# Patient Record
Sex: FEMALE | Race: ASIAN | NOT HISPANIC OR LATINO
[De-identification: names, ages, dates, MRNs, and addresses within clinical notes are randomized per-mention and may not be internally consistent; named-entity substitution may affect disease eponyms.]

---

## 2017-11-09 ENCOUNTER — APPOINTMENT (OUTPATIENT)
Dept: OTOLARYNGOLOGY | Facility: CLINIC | Age: 47
End: 2017-11-09
Payer: COMMERCIAL

## 2017-11-09 VITALS
DIASTOLIC BLOOD PRESSURE: 76 MMHG | OXYGEN SATURATION: 97 % | SYSTOLIC BLOOD PRESSURE: 110 MMHG | TEMPERATURE: 98.4 F | HEART RATE: 89 BPM

## 2017-11-09 PROCEDURE — 31575 DIAGNOSTIC LARYNGOSCOPY: CPT

## 2017-11-09 PROCEDURE — 99214 OFFICE O/P EST MOD 30 MIN: CPT | Mod: 25

## 2017-11-09 RX ORDER — GUAIFENESIN 1200 MG/1
1200 TABLET, EXTENDED RELEASE ORAL
Qty: 28 | Refills: 0 | Status: ACTIVE | COMMUNITY
Start: 2017-11-09 | End: 1900-01-01

## 2017-11-09 RX ORDER — IBUPROFEN AND PSEUDOEPHEDRINE HYDROCHLORIDE 200; 30 MG/1; MG/1
30-200 TABLET, COATED ORAL
Qty: 30 | Refills: 0 | Status: ACTIVE | COMMUNITY
Start: 2017-11-09 | End: 1900-01-01

## 2018-06-04 ENCOUNTER — APPOINTMENT (OUTPATIENT)
Dept: OTOLARYNGOLOGY | Facility: CLINIC | Age: 48
End: 2018-06-04
Payer: COMMERCIAL

## 2018-06-04 VITALS
TEMPERATURE: 98 F | SYSTOLIC BLOOD PRESSURE: 114 MMHG | HEART RATE: 80 BPM | OXYGEN SATURATION: 98 % | DIASTOLIC BLOOD PRESSURE: 79 MMHG

## 2018-06-04 DIAGNOSIS — H92.01 OTALGIA, RIGHT EAR: ICD-10-CM

## 2018-06-04 DIAGNOSIS — H75.81 OTHER SPECIFIED DISORDERS OF RIGHT MIDDLE EAR AND MASTOID IN DISEASES CLASSIFIED ELSEWHERE: ICD-10-CM

## 2018-06-04 DIAGNOSIS — H93.90 UNSPECIFIED DISORDER OF EAR, UNSPECIFIED EAR: ICD-10-CM

## 2018-06-04 PROCEDURE — 99214 OFFICE O/P EST MOD 30 MIN: CPT | Mod: 25

## 2018-06-04 PROCEDURE — 92557 COMPREHENSIVE HEARING TEST: CPT

## 2018-06-04 PROCEDURE — 92511 NASOPHARYNGOSCOPY: CPT

## 2018-06-04 PROCEDURE — 92567 TYMPANOMETRY: CPT

## 2018-06-05 PROBLEM — H75.81 OTHER SPECIFIED DISORDERS OF RIGHT MIDDLE EAR AND MASTOID IN DISEASES CLASSIFIED ELSEWHERE: Status: ACTIVE | Noted: 2018-06-05

## 2018-06-05 PROBLEM — H92.01 OTALGIA OF RIGHT EAR: Status: ACTIVE | Noted: 2018-06-05

## 2018-06-05 PROBLEM — H93.90 EAR PROBLEM: Status: ACTIVE | Noted: 2018-06-04

## 2018-06-18 ENCOUNTER — APPOINTMENT (OUTPATIENT)
Dept: OTOLARYNGOLOGY | Facility: CLINIC | Age: 48
End: 2018-06-18

## 2018-11-29 ENCOUNTER — APPOINTMENT (OUTPATIENT)
Dept: OTOLARYNGOLOGY | Facility: CLINIC | Age: 48
End: 2018-11-29
Payer: COMMERCIAL

## 2018-11-29 VITALS
OXYGEN SATURATION: 98 % | HEART RATE: 84 BPM | DIASTOLIC BLOOD PRESSURE: 87 MMHG | TEMPERATURE: 98.5 F | RESPIRATION RATE: 17 BRPM | SYSTOLIC BLOOD PRESSURE: 137 MMHG

## 2018-11-29 DIAGNOSIS — J04.0 ACUTE LARYNGITIS: ICD-10-CM

## 2018-11-29 DIAGNOSIS — K21.9 ACUTE LARYNGITIS: ICD-10-CM

## 2018-11-29 PROCEDURE — 31575 DIAGNOSTIC LARYNGOSCOPY: CPT

## 2018-11-29 PROCEDURE — 76536 US EXAM OF HEAD AND NECK: CPT

## 2018-11-29 PROCEDURE — 99214 OFFICE O/P EST MOD 30 MIN: CPT | Mod: 25

## 2018-12-05 LAB
25(OH)D3 SERPL-MCNC: 31.4 NG/ML
T3FREE SERPL-MCNC: 2.88 PG/ML
T4 FREE SERPL-MCNC: 2 NG/DL
TSH SERPL-ACNC: 0.82 UIU/ML

## 2019-03-20 ENCOUNTER — APPOINTMENT (OUTPATIENT)
Dept: OTOLARYNGOLOGY | Facility: CLINIC | Age: 49
End: 2019-03-20
Payer: COMMERCIAL

## 2019-03-20 VITALS
HEART RATE: 86 BPM | TEMPERATURE: 98.2 F | DIASTOLIC BLOOD PRESSURE: 75 MMHG | OXYGEN SATURATION: 99 % | SYSTOLIC BLOOD PRESSURE: 127 MMHG

## 2019-03-20 DIAGNOSIS — E06.3 AUTOIMMUNE THYROIDITIS: ICD-10-CM

## 2019-03-20 DIAGNOSIS — E04.1 NONTOXIC SINGLE THYROID NODULE: ICD-10-CM

## 2019-03-20 PROCEDURE — 31575 DIAGNOSTIC LARYNGOSCOPY: CPT

## 2019-03-20 PROCEDURE — 99214 OFFICE O/P EST MOD 30 MIN: CPT | Mod: 25

## 2019-03-20 NOTE — REASON FOR VISIT
[FreeTextEntry2] :  a followup for a thyroid nodules,  hypothyroidism and autoimmune thyroiditis. [FreeTextEntry1] : PCP is MD Shilpa  [facility]  [phone].  Dr. Marilynn Lemos is her Endocrinologist.  [address] [place] [address]

## 2019-03-20 NOTE — PROCEDURE
[Hoarseness] : hoarseness not clearly evaluated by indirect laryngoscopy [Image(s) Captured] : image(s) captured and filed [Complicated Symptoms] : complicated symptoms requiring more thorough examination than provided by mirror [Globus] : globus [Topical Lidocaine] : topical lidocaine [Flexible Endoscope] : examined with the flexible endoscope [Serial Number: ___] : Serial Number: [unfilled] [de-identified] : The nasal septum is minimally deviated to the left. There are no masses or polyps. Nasal secretions are thick but not purulent. The choanae and posterior nasopharynx are normal without masses or drainage. The Eustachian tube orifices appear patent. The pharynx, including the posterior and lateral pharyngeal walls, the vallecula and base of tongue are normal without ulcerations, lesions or masses. The hypopharynx including the pyriform sinuses open well without pooling of secretions, mucosal lesions or masses. The supraglottic larynx including the epiglottis, petiole, glossoepiglottic folds and pharyngoepiglottic folds are normal without mucosal lesions, ulcerations or masses. The glottis reveals normal false vocal folds. The true vocal folds are glistening white, tense and of equal length, without paralysis, having symmetric mobility on adduction and abduction. There are no mucosal lesions, nodules, cysts, erythroplasia or leukoplakia. The posterior cricoid area has healthy pink mucosa in the interarytenoid area and esophageal inlet. There is persistent thickening/edema/tiger striping of the interarytenoid mucosa and lingual tonsillar hyperplasia consistent with laryngopharyngeal acid reflux disease. The trachea is clear without narrowing, deviation or lesions. \par  [de-identified] : thyroid nodules, thyroiditis and neck tightness

## 2019-03-25 LAB
25(OH)D3 SERPL-MCNC: 28.7 NG/ML
T3FREE SERPL-MCNC: 2.79 PG/ML
T4 FREE SERPL-MCNC: 1.9 NG/DL
TSH SERPL-ACNC: 1.1 UIU/ML

## 2020-12-07 ENCOUNTER — APPOINTMENT (OUTPATIENT)
Dept: OTOLARYNGOLOGY | Facility: CLINIC | Age: 50
End: 2020-12-07

## 2021-02-16 ENCOUNTER — EMERGENCY (EMERGENCY)
Facility: HOSPITAL | Age: 51
LOS: 0 days | Discharge: HOME | End: 2021-02-16
Payer: COMMERCIAL

## 2021-02-16 VITALS
DIASTOLIC BLOOD PRESSURE: 91 MMHG | HEIGHT: 65 IN | RESPIRATION RATE: 19 BRPM | TEMPERATURE: 98 F | HEART RATE: 93 BPM | WEIGHT: 184.09 LBS | SYSTOLIC BLOOD PRESSURE: 137 MMHG | OXYGEN SATURATION: 99 %

## 2021-02-16 DIAGNOSIS — Z79.1 LONG TERM (CURRENT) USE OF NON-STEROIDAL ANTI-INFLAMMATORIES (NSAID): ICD-10-CM
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
## 2021-02-16 DIAGNOSIS — M79.675 PAIN IN LEFT TOE(S): ICD-10-CM

## 2021-02-16 DIAGNOSIS — M79.672 PAIN IN LEFT FOOT: ICD-10-CM

## 2021-02-16 DIAGNOSIS — E03.9 HYPOTHYROIDISM, UNSPECIFIED: ICD-10-CM

## 2021-02-16 DIAGNOSIS — Z88.0 ALLERGY STATUS TO PENICILLIN: ICD-10-CM
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
## 2021-02-16 DIAGNOSIS — Z79.84 LONG TERM (CURRENT) USE OF ORAL HYPOGLYCEMIC DRUGS: ICD-10-CM

## 2021-02-16 DIAGNOSIS — Z88.8 ALLERGY STATUS TO OTHER DRUGS, MEDICAMENTS AND BIOLOGICAL SUBSTANCES: ICD-10-CM

## 2021-02-16 DIAGNOSIS — E11.9 TYPE 2 DIABETES MELLITUS WITHOUT COMPLICATIONS: ICD-10-CM

## 2021-02-16 LAB — HCG UR QL: NEGATIVE — SIGNIFICANT CHANGE UP

## 2021-02-16 PROCEDURE — 73630 X-RAY EXAM OF FOOT: CPT | Mod: 26,LT

## 2021-02-16 PROCEDURE — 99283 EMERGENCY DEPT VISIT LOW MDM: CPT

## 2021-02-16 RX ORDER — ACETAMINOPHEN 500 MG
650 TABLET ORAL ONCE
Refills: 0 | Status: COMPLETED | OUTPATIENT
Start: 2021-02-16 | End: 2021-02-16

## 2021-02-16 RX ADMIN — Medication 650 MILLIGRAM(S): at 17:06

## 2021-02-16 NOTE — ED PROVIDER NOTE - PROGRESS NOTE DETAILS
AAox3 non toxic looking afebrile sitting in chair in NAD, breathing comfortably. tylenol given for pain. pain improved. Xray right toes/foot ordered. UCG pending. patient remains asymptomatic. no complaint at this time

## 2021-02-16 NOTE — ED PROVIDER NOTE - OBJECTIVE STATEMENT
51 y/o female UCG pending, patient denies pregnancy, waiver signed for xray, with PMHx DM, hypothyroidism presented to the ED with complaint of intermittent sharp left big toe pain radiating to left danny area post twisting foot while going down the stairs, better with motrin / tylenol, worst with walking. able to walk denies fall, injury, trauma, loc, syncope, numbness, weakness, lost of motion, decrease sensory

## 2021-02-16 NOTE — ED PROVIDER NOTE - CLINICAL SUMMARY MEDICAL DECISION MAKING FREE TEXT BOX
51 y/o female UCG pending, patient denies pregnancy, waiver signed for xray, with PMHx DM, hypothyroidism here for left big toe pain post twisting while going down the stairs    imp : MSK pain vs DJD vs arthritis vs bone spur r/o fx or dislocation     plan   tylenol   xray   rest, ice/warm compress, reassess   patient education   F/u with PCP

## 2021-02-16 NOTE — ED PROVIDER NOTE - NSFOLLOWUPCLINICS_GEN_ALL_ED_FT
[facility]  Podiatry  [address] [place] [address] - 3rd [place] [address]  Phone: [phone]  Fax:   Follow Up Time:

## 2021-02-16 NOTE — ED PROVIDER NOTE - PATIENT PORTAL LINK FT
You can access the FollowMyHealth Patient Portal offered by [facility] by registering at the following website: [email or web address]. By joining [employer]’s FollowMyHealth portal, you will also be able to view your health information using other applications (apps) compatible with our system.

## 2021-02-16 NOTE — ED PROVIDER NOTE - NSFOLLOWUPINSTRUCTIONS_ED_ALL_ED_FT
please take tylenol 325 mg every 4 hour as needed for pain     Please return to the ED for worst symptoms numbness, discoloration, paresthesia, weakness, lost of motion, decrease sensory,     please schedule to follow up with Podiatry doctor within 7 days for toe pain [phone] please take Tylenol 325 mg every 4 hour as needed for pain x 7 days     Please return to the ED for worst symptoms numbness, discoloration, paresthesia, weakness, lost of motion, decrease sensory, increase pain,     please schedule to follow up with Podiatry doctor within 7 days for toe pain [phone].  Xray of left foot and toes were done, shows no fracture or dislocation

## 2021-02-16 NOTE — ED PROVIDER NOTE - MUSCULOSKELETAL MINIMAL EXAM
left proximal big toe localized swelling with assocated TTP, limited ROM due to pain and swelling, skiin intact, distal pulse intact, cap refill < 2 sec,

## 2022-03-22 ENCOUNTER — APPOINTMENT (OUTPATIENT)
Dept: OTOLARYNGOLOGY | Facility: CLINIC | Age: 52
End: 2022-03-22
Payer: COMMERCIAL

## 2022-03-22 VITALS
OXYGEN SATURATION: 99 % | HEIGHT: 65 IN | TEMPERATURE: 98.2 F | HEART RATE: 78 BPM | SYSTOLIC BLOOD PRESSURE: 127 MMHG | WEIGHT: 185 LBS | BODY MASS INDEX: 30.82 KG/M2 | DIASTOLIC BLOOD PRESSURE: 80 MMHG

## 2022-03-22 PROCEDURE — 76536 US EXAM OF HEAD AND NECK: CPT

## 2022-03-22 PROCEDURE — 31575 DIAGNOSTIC LARYNGOSCOPY: CPT

## 2022-03-22 PROCEDURE — 99215 OFFICE O/P EST HI 40 MIN: CPT | Mod: 25

## 2022-03-22 NOTE — CONSULT LETTER
[Dear  ___] : Dear  [unfilled], [Consult Letter:] : I had the pleasure of evaluating your patient, [unfilled]. [Please see my note below.] : Please see my note below. [Consult Closing:] : Thank you very much for allowing me to participate in the care of this patient.  If you have any questions, please do not hesitate to contact me. [Sincerely,] : Sincerely, [FreeTextEntry3] : \par Chidi Monk M.D., FACS, ECNU\par Director Center for Thyroid & Parathyroid Surgery\par [facility] [place]\par Certified in Thyroid/Parathyroid/Neck Ultrasound, ECNU/ AIUM\par \par , Department of Otolaryngology\par [facility] [employer] [facility] at [facility]\par

## 2022-03-22 NOTE — PROCEDURE
[Image(s) Captured] : image(s) captured and filed [Hoarseness] : hoarseness not clearly evaluated by indirect laryngoscopy [Complicated Symptoms] : complicated symptoms requiring more thorough examination than provided by mirror [Globus] : globus [Topical Lidocaine] : topical lidocaine [Oxymetazoline HCl] : oxymetazoline HCl [Flexible Endoscope] : examined with the flexible endoscope [Serial Number: ___] : Serial Number: [unfilled] [FreeTextEntry3] : \par \par [facility]\par THYROID/NECK ULTRASOUND REPORT\par \par NAME: YUE METZGER            MR# [id number]	              : 1970	         DATE: 3/22/2022\par \par HISTORY/ INDICATIONS: A 51-year-old female with autoimmune thyroiditis and subcentimeter thyroid nodules/cysts for follow up.\par \par COMPARISON: Study dated 18.\par \par PROCEDURE: Physician performed high-resolution ultrasound gray scale imaging and color Doppler supplementation of the thyroid gland and neck was obtained in the longitudinal and transverse planes using a 13 MHz linear transducer with image capture.  All measurements are in centimeters (longitudinal x AP x transverse).  \par \par FINDINGS: Overall the thyroid gland is normal in size, hypoechoic and markedly heterogeneous in echotexture with numerous probable pseudo nodules, and echogenic septations consistent with autoimmune thyroiditis.  There is a stable 6 mm right mid-lower lobe colloid cyst. Vascularity is minimal on color Doppler flow.  \par \par RIGHT LOBE: Is not enlarged, hypoechoic and markedly heterogeneous in echotexture with numerous probable pseudo nodules, and echogenic septations consistent with autoimmune thyroiditis.  Vascularity is minimal color Doppler and measures 4.02 x 1.43 x 1.09 cm.  NODULES:  No definitive nodules are identified but there is micro nodularity throughout consistent with autoimmune thyroiditis.  Within the mid-lower right lobe there is a smoothly marginated hypoechoic nodule/cyst without calcifications and grade 1 vascularity that measures 0.66 x 0.35 x 0.61 cm (previously 0.49 x 0.27 x 0.54 essentially unchanged).\par \par ISTHMUS: Measures 0.69 cm in AP dimension and is heterogeneous in echotexture with decreased vascularity.  No nodules are identified.\par \par LEFT LOBE: Is not enlarged, hypoechoic and markedly heterogeneous in echotexture with numerous probable pseudo nodules, and echogenic septations consistent with autoimmune thyroiditis and measures 4.43 x 1.25 x 1.08 cm.  Vascularity is minimal on color Doppler.    NODULES: No definitive nodules are identified but there is micro nodularity throughout consistent with autoimmune thyroiditis.\par \par PARATHYROID GLANDS: There are no identified enlarged parathyroid glands in the central neck compartment. \par \par LYMPH NODES: There are several benign appearing subcentimeter lymph nodes identified at neck levels III bilaterally, all with echogenic hilar lines and a short long axis ratio < 0.5 in the transverse plane.\par \par IMPRESSION: A 51-year-old female with a non-enlarged heterogeneous thyroid gland consistent with autoimmune thyroiditis and a stable right interpolar cyst.  No other nodules are currently identified among many probable pseudo nodules of autoimmune thyroiditis.\par \par RECOMMENDATIONS: A follow up ultrasound can be considered in 2-3 years.\par \par Electronically signed by Chidi Monk MD on 3/22/2022 4:30 PM. [de-identified] : The nasal septum is minimally deviated to the left. There are no masses or polyps. Nasal secretions are thick but not purulent. The choanae and posterior nasopharynx are normal without masses or drainage. The Eustachian tube orifices appear patent. The pharynx, including the posterior and lateral pharyngeal walls, the vallecula and base of tongue are normal without ulcerations, lesions or masses. The hypopharynx including the pyriform sinuses open well without pooling of secretions, mucosal lesions or masses. The supraglottic larynx including the epiglottis, petiole, glossoepiglottic folds and pharyngoepiglottic folds are normal without mucosal lesions, ulcerations or masses. The glottis reveals normal false vocal folds. The true vocal folds are glistening white, tense and of equal length, without paralysis, having symmetric mobility on adduction and abduction. There are no mucosal lesions, nodules, cysts, erythroplasia or leukoplakia. The posterior cricoid area has healthy pink mucosa in the interarytenoid area and esophageal inlet. There is mild thickening/edema/tiger striping of the interarytenoid mucosa and lingual tonsillar hyperplasia consistent with laryngopharyngeal acid reflux disease. The trachea is clear without narrowing, deviation or lesions. \par  [de-identified] : thyroid nodules, thyroiditis and neck tightness.

## 2022-03-22 NOTE — DATA REVIEWED
[de-identified] : see HPI [de-identified] : see HPI [de-identified] : Cytology report reviewed

## 2022-03-22 NOTE — REASON FOR VISIT
[FreeTextEntry2] :  a followup for a thyroid nodules, hypothyroidism and autoimmune thyroiditis. [FreeTextEntry1] : PCP is MD Shilpa  [facility]  [phone].  Dr. Marilynn Lemos is her Endocrinologist.  [address] [place] [address]

## 2022-03-22 NOTE — HISTORY OF PRESENT ILLNESS
[de-identified] : Juliet is a 42 y/o female with type 2 diabetes who was in [place] mid March and returned home a week later and developed a slight cough, PND and hoarseness.  She had increased throat clearing and a pressure headache.  She had a low-grade fever which has since resolved. She took Advil cold and sinus with some relief of the headaches but she remains intermittently hoarse.  She has a history of hypothyroidism and remains on her usual dose of Synthroid at 88 mcg.  Her last set of thyroid function studies  demonstrated an elevated TSH at 4.60 which is above the normal range and associated with a free T4 at 1.5 which is in the normal range.  The T3 total was 80 in normal range.  Her serum calcium was normal.  She has known Hashimoto's thyroiditis and a 1.6 cm isoechoic nodule had been biopsied in the isthmus of the gland and 2007 and consistent with a hyperplastic nodule in Hashimoto's thyroiditis with a lymphoid infiltrate.  She had a thyroid ultrasound performed by her endocrinologist Dr. Lemos who noted a 0.8 x 0.7 cm left lobe nodule and a diffusely heterogeneous thyroid gland.  It is unclear whether this is a new nodule, pseudonodule or the isthmus nodule on the left. She denies recent weight loss, shortness of breath, but currently is hoarse. She also feels a sensation of tightness in her neck but denies pain.  She occasionally chokes on large pieces of meat but in general is able to swallow without difficulty and has not had any weight loss.  She has a long history of chronic GERD and frequently has pyrosis. \par  [FreeTextEntry1] : Juliet is here to f/u her thyroid nodules and management of her autoimmune thyroiditis.  She is stable on LT4 112 mcgs daily and euthyroid. She had a stress test in 2019 that was normal. She is no longer having intermittent shortness of breath at rest and with activity.  She has chronic GERD that she manages with Omeprazole PRN.  She denies any new neck mass to palpation or pain. There is no family history of thyroid cancer. She denies any known radiation exposures in her youth. She is managed on Metformin for type 2 DM with a higher dose by her PCP who also continues to monitor her thyroid function.  Her weight has been stable. She has had a stable right right subcentimeter colloid cyst and a left lobe subcentimeter solid nodule not suspicious for malignancy. An isthmus nodule was biopsied in 2007 and benign. She has not had a f/u ultrasound since 2019.  She denies fever, body aches, cough, cyanosis, chest burning, anosmia or recent known COVID exposures.  All family members at home are well. She is fully vaccinated and boosted.

## 2022-03-23 LAB
25(OH)D3 SERPL-MCNC: 25.6 NG/ML
T4 FREE SERPL-MCNC: 1.7 NG/DL
TSH SERPL-ACNC: 1.72 UIU/ML

## 2022-11-25 ENCOUNTER — EMERGENCY (EMERGENCY)
Facility: HOSPITAL | Age: 52
LOS: 1 days | Discharge: HOME | End: 2022-11-25
Attending: EMERGENCY MEDICINE | Admitting: EMERGENCY MEDICINE

## 2022-11-25 VITALS
DIASTOLIC BLOOD PRESSURE: 84 MMHG | SYSTOLIC BLOOD PRESSURE: 151 MMHG | TEMPERATURE: 98 F | OXYGEN SATURATION: 98 % | HEART RATE: 92 BPM | RESPIRATION RATE: 20 BRPM

## 2022-11-25 VITALS
OXYGEN SATURATION: 99 % | SYSTOLIC BLOOD PRESSURE: 143 MMHG | HEART RATE: 86 BPM | DIASTOLIC BLOOD PRESSURE: 78 MMHG | TEMPERATURE: 98 F | RESPIRATION RATE: 16 BRPM

## 2022-11-25 PROCEDURE — 99284 EMERGENCY DEPT VISIT MOD MDM: CPT

## 2022-11-25 PROCEDURE — 93010 ELECTROCARDIOGRAM REPORT: CPT | Mod: NC

## 2022-11-25 RX ORDER — LIDOCAINE 4 G/100G
1 CREAM TOPICAL ONCE
Refills: 0 | Status: COMPLETED | OUTPATIENT
Start: 2022-11-25 | End: 2022-11-25

## 2022-11-25 RX ORDER — ACETAMINOPHEN 500 MG
975 TABLET ORAL ONCE
Refills: 0 | Status: COMPLETED | OUTPATIENT
Start: 2022-11-25 | End: 2022-11-25

## 2022-11-25 RX ORDER — IBUPROFEN 200 MG
600 TABLET ORAL ONCE
Refills: 0 | Status: COMPLETED | OUTPATIENT
Start: 2022-11-25 | End: 2022-11-25

## 2022-11-25 RX ADMIN — Medication 600 MILLIGRAM(S): at 13:31

## 2022-11-25 RX ADMIN — Medication 975 MILLIGRAM(S): at 13:31

## 2022-11-25 RX ADMIN — LIDOCAINE 1 PATCH: 4 CREAM TOPICAL at 13:00

## 2022-11-25 RX ADMIN — Medication 975 MILLIGRAM(S): at 13:01

## 2022-11-25 RX ADMIN — Medication 600 MILLIGRAM(S): at 13:01

## 2022-11-25 NOTE — ED PROVIDER NOTE - OBJECTIVE STATEMENT
52 year old female with PMH DM, hypothyroidism presents with R lateral thigh pain starting last night, worse with movement. Had trouble sleeping last night due to pain. 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 Denies fever, chills, redness, swelling, numbness/tingling, weakness. Patient is ambulatory in ED. Patient also reports episode of chest and left arm pain last night associated with diaphoresis while laying on her left side, now resolved and has not recurred.

## 2022-11-25 NOTE — ED PROVIDER NOTE - CLINICAL SUMMARY MEDICAL DECISION MAKING FREE TEXT BOX
Aleida Gutierrez DO PGY-2  52 year old female with PMH DM, hypothyroidism presents with R lateral thigh pain starting last night, worse with movement. Had trouble sleeping last night due to pain. Tender along R ileotibeal band. Neurovascularly intact, full ROM at hips, knees, ankles. No joint edema or erythema. No bony tenderness. Will treat pain, provide stretching exercises, and likely dc with sports referral.

## 2022-11-25 NOTE — ED PROVIDER NOTE - NSFOLLOWUPINSTRUCTIONS_ED_ALL_ED_FT
You were seen in the ER for your right thigh pain.     Take ibuprofen 600 mg every 6-8 hours for your pain.  Additionally, you can take 1000 mg acetaminophen every 8 hours for your pain. Do not exceed 3000 mg of acetaminophen in one 24-hour period.     Follow up with your primary doctor within 3 days. Let them know about the episode of chest pain. Your EKG today was normal. You can take 81 mg aspirin until you see your doctor.    Additionally, you can follow up with the sports clinic if your symptoms persist.    You were given copies of the labs and imaging results, please take them to your follow up appointments.    Return to the ER for any worsening symptoms or concerns including numbness/tingling, weakness, fevers, joint swelling, or any other concerns.

## 2022-11-25 NOTE — ED PROVIDER NOTE - PHYSICAL EXAMINATION
PHYSICAL EXAM:  CONSTITUTIONAL: Well appearing, awake, alert, oriented to person, place, time/situation and in no apparent distress.  HEAD: Atraumatic  EYES: Clear bilaterally, pupils equal, round and reactive to light.  ENMT: Airway patent, Nasal mucosa clear. Mouth with normal mucosa. Uvula is midline.   CARDIAC: Normal rate, regular rhythm. +S1/S2. No murmurs, rubs or gallops.  RESPIRATORY: Breathing unlabored. Breath sounds clear and equal bilaterally.  NEUROLOGICAL: Alert and oriented, no focal deficits, no motor or sensory deficits. CN2-12 intact. Sensation intact x4 extremities. Strength 5/5 of upper and lower extremities B/L.  MSK: Tenderness to palpation along R lateral thigh along IT band. No clubbing, cyanosis, or edema. Full range of motion of all extremities. No pain with passive range of motion of B/L hips, knees or ankles. No midline or paraspinal tenderness. No spinal step-offs.  SKIN: Skin warm and dry. No evidence of rashes or lesions.

## 2022-11-25 NOTE — ED PROVIDER NOTE - NSFOLLOWUPCLINICS_GEN_ALL_ED_FT
[facility]  Sports Medicine  [address] [place] [address]  Phone: [phone]  Fax:   Follow Up Time: Routine

## 2022-11-25 NOTE — ED ADULT NURSE NOTE - OBJECTIVE STATEMENT
Received patient in Intake 8 c/o Received patient in Intake 8 c/o right thigh pain x 1 day, patient denies hx of injury. Patient denies SOB, chest pain, fever. Patient is A&OX4, airway patent, breathing unlabored and even, radial pulses palpable. Medications given as ordered. Side rails up and safety maintained. Fall precaution in place. Family at bedside.

## 2022-11-25 NOTE — ED ADULT NURSE NOTE - NSIMPLEMENTINTERV_GEN_ALL_ED
Implemented All Fall with Harm Risk Interventions:  [place] to call system. Call bell, personal items and telephone within reach. Instruct patient to call for assistance. Room bathroom lighting operational. Non-slip footwear when patient is off stretcher. Physically safe environment: no spills, clutter or unnecessary equipment. Stretcher in lowest position, wheels locked, appropriate side rails in place. Provide visual cue, wrist band, yellow gown, etc. Monitor gait and stability. Monitor for mental status changes and reorient to person, place, and time. Review medications for side effects contributing to fall risk. Reinforce activity limits and safety measures with patient and family. Provide visual clues: red socks.

## 2022-11-25 NOTE — ED PROVIDER NOTE - ATTENDING CONTRIBUTION TO CARE
Seen and examined, pt. with several d. hx of R lateral thigh pain, +inc. exertion/activity but no direct trauma, gradual onset of pain, worse last night and this a.m. unable to bear weight. Pain to R lateral thigh and then rad. down leg to knee, no weakness/numbness, no fever/chills. Clear lungs, heart reg, abd soft, NT to palp, no hernia, NT spine, NT back, mild tender R lat. thigh, no skin break, no creptiance, NT hip/knee.

## 2022-11-25 NOTE — ED PROVIDER NOTE - PATIENT PORTAL LINK FT
You can access the FollowMyHealth Patient Portal offered by [facility] by registering at the following website: [email or web address]. By joining [employer]’s FollowMyHealth portal, you will also be able to view your health information using other applications (apps) compatible with our system.

## 2022-11-25 NOTE — ED PROVIDER NOTE - PROGRESS NOTE DETAILS
Aleida Gutierrez DO PGY-2  Patient re-evaluated, symptoms improved.     I discussed the plan for discharge home with the patient. Instructed the patient to return to the emergency department with any alarming symptoms, any worsening symptoms, or any other concerning symptoms.  I instructed this patient to call their primary doctor today, to inform them of their visit to the emergency department, and to obtain a repeat evaluation from their primary doctor in the next 1-3 days. The patient understands and agrees with this plan for follow up and feels safe returning home.  At the time of discharge this patient remained in stable condition, remained in no acute distress, and their vital signs remained stable measured within normal limits.

## 2023-02-02 ENCOUNTER — APPOINTMENT (OUTPATIENT)
Dept: OTOLARYNGOLOGY | Facility: CLINIC | Age: 53
End: 2023-02-02
Payer: COMMERCIAL

## 2023-02-02 VITALS
HEIGHT: 65 IN | HEART RATE: 90 BPM | DIASTOLIC BLOOD PRESSURE: 85 MMHG | WEIGHT: 170 LBS | RESPIRATION RATE: 16 BRPM | BODY MASS INDEX: 28.32 KG/M2 | TEMPERATURE: 97.9 F | OXYGEN SATURATION: 98 % | SYSTOLIC BLOOD PRESSURE: 120 MMHG

## 2023-02-02 DIAGNOSIS — R49.9 UNSPECIFIED VOICE AND RESONANCE DISORDER: ICD-10-CM

## 2023-02-02 PROCEDURE — 31575 DIAGNOSTIC LARYNGOSCOPY: CPT

## 2023-02-02 PROCEDURE — 99215 OFFICE O/P EST HI 40 MIN: CPT | Mod: 25

## 2023-02-02 NOTE — DATA REVIEWED
[de-identified] : see HPI [de-identified] : see HPI [de-identified] : Cytology report reviewed

## 2023-02-02 NOTE — CONSULT LETTER
[Dear  ___] : Dear  [unfilled], [Consult Letter:] : I had the pleasure of evaluating your patient, [unfilled]. [Please see my note below.] : Please see my note below. [Consult Closing:] : Thank you very much for allowing me to participate in the care of this patient.  If you have any questions, please do not hesitate to contact me. [Sincerely,] : Sincerely, [FreeTextEntry3] : \par Chidi Monk M.D., FACS, ECNU\par Director Center for Thyroid & Parathyroid Surgery\par [facility] [place] [facility]\par Certified in Thyroid/Parathyroid/Neck Ultrasound, ECNU/ AIUM\par \par , Department of Otolaryngology\par [facility] [employer] at [facility]\par

## 2023-02-02 NOTE — HISTORY OF PRESENT ILLNESS
[de-identified] : Juliet is a 42 y/o female with type 2 diabetes who was in [place] mid March and returned home a week later and developed a slight cough, PND and hoarseness.  She had increased throat clearing and a pressure headache.  She had a low-grade fever which has since resolved. She took Advil cold and sinus with some relief of the headaches but she remains intermittently hoarse.  She has a history of hypothyroidism and remains on her usual dose of Synthroid at 88 mcg.  Her last set of thyroid function studies  demonstrated an elevated TSH at 4.60 which is above the normal range and associated with a free T4 at 1.5 which is in the normal range.  The T3 total was 80 in normal range.  Her serum calcium was normal.  She has known Hashimoto's thyroiditis and a 1.6 cm isoechoic nodule had been biopsied in the isthmus of the gland and 2007 and consistent with a hyperplastic nodule in Hashimoto's thyroiditis with a lymphoid infiltrate.  She had a thyroid ultrasound performed by her endocrinologist Dr. Lemos who noted a 0.8 x 0.7 cm left lobe nodule and a diffusely heterogeneous thyroid gland.  It is unclear whether this is a new nodule, pseudonodule or the isthmus nodule on the left. She denies recent weight loss, shortness of breath, but currently is hoarse. She also feels a sensation of tightness in her neck but denies pain.  She occasionally chokes on large pieces of meat but in general is able to swallow without difficulty and has not had any weight loss.  She has a long history of chronic GERD and frequently has pyrosis. \par  [FreeTextEntry1] : Juliet is here to f/u her thyroid nodules and management of her autoimmune thyroiditis.  She is stable on LT4 112 mcgs daily and euthyroid. She had a stress test in 2019 that was normal.  She has chronic GERD that she manages with Omeprazole PRN.  She denies any new neck mass to palpation or pain. There is no family history of thyroid cancer.  She denies any known radiation exposures in her youth. She is managed on Metformin for type 2 DM with a higher dose by her PCP who also continues to monitor her thyroid function.  She lost ten pounds over the past 4 months by eating healthier.  She has had a stable right right subcentimeter colloid cyst and a left lobe subcentimeter solid nodule not suspicious for malignancy. An isthmus nodule was biopsied in 2007 and benign. She has not had a f/u ultrasound since 2019.  She denies fever, body aches, cough, cyanosis, chest burning, anosmia or recent known COVID exposures.  All family members at home are well. She is fully vaccinated and boosted. Last month she had a URI (fatigue, headache and sore throat)  and tested (-) for COVID x 2.  She initialy improved with Advil cold and sinus for 6 days but now has a sensation in her throat x 1 week that something is caught and causing increased snoring and a dry mouth. She feels more hoarse during this time. She denies PND or hx of a sinus infection. She denies GERD sx's.  She never had an EGD as recommended last year. She denies true dysphagia.

## 2023-02-02 NOTE — REASON FOR VISIT
[FreeTextEntry2] :  a followup for a thyroid nodules, hypothyroidism, GERD and autoimmune thyroiditis. [FreeTextEntry1] : PCP is MD Shilpa  [facility]  [phone].  Dr. Marilynn Lemos is her Endocrinologist.  [address] [place] [address]

## 2023-02-02 NOTE — PROCEDURE
[Image(s) Captured] : image(s) captured and filed [Hoarseness] : hoarseness not clearly evaluated by indirect laryngoscopy [Complicated Symptoms] : complicated symptoms requiring more thorough examination than provided by mirror [Topical Lidocaine] : topical lidocaine [Globus] : globus [Oxymetazoline HCl] : oxymetazoline HCl [Flexible Endoscope] : examined with the flexible endoscope [Serial Number: ___] : Serial Number: [unfilled] [Unable to Cooperate with Mirror] : patient unable to cooperate with mirror [Gag Reflex] : gag reflex preventing mirror examination [de-identified] : The nasal septum is minimally deviated to the left. There are no masses or polyps. Nasal secretions are thick and cloudy. The choanae and posterior nasopharynx are normal without masses. The Eustachian tube orifices appear patent. The pharynx, including the posterior and lateral pharyngeal walls, the vallecula and base of tongue are normal without ulcerations, lesions or masses. The hypopharynx including the pyriform sinuses open well without pooling of secretions, mucosal lesions or masses. The supraglottic larynx including the epiglottis, petiole, glossoepiglottic folds and pharyngoepiglottic folds are normal without mucosal lesions, ulcerations or masses. The lingual and palatine tonsils are edematous and hyperemic with lymphoid hyperplasia. The glottis reveals normal false vocal folds. The true vocal folds are glistening white, tense and of equal length, without paralysis, having symmetric mobility on adduction and abduction. There are no mucosal lesions, nodules, cysts, erythroplasia or leukoplakia. The posterior cricoid area has healthy pink mucosa in the interarytenoid area and esophageal inlet. There is marked thickening/edema/tiger striping of the interarytenoid mucosa and lingual tonsillar hyperplasia consistent with laryngopharyngeal acid reflux disease. The trachea is clear without narrowing, deviation or lesions. Throat culture sent. \par  [de-identified] : thyroid nodules, recent URI and increased snoring with globus sensation

## 2023-02-06 LAB — BACTERIA THROAT CULT: NORMAL

## 2023-02-27 ENCOUNTER — APPOINTMENT (OUTPATIENT)
Dept: OTOLARYNGOLOGY | Facility: CLINIC | Age: 53
End: 2023-02-27
Payer: COMMERCIAL

## 2023-02-27 VITALS
OXYGEN SATURATION: 98 % | RESPIRATION RATE: 16 BRPM | HEART RATE: 91 BPM | DIASTOLIC BLOOD PRESSURE: 78 MMHG | SYSTOLIC BLOOD PRESSURE: 115 MMHG

## 2023-02-27 VITALS
HEART RATE: 95 BPM | BODY MASS INDEX: 28.32 KG/M2 | DIASTOLIC BLOOD PRESSURE: 87 MMHG | TEMPERATURE: 97.8 F | HEIGHT: 65 IN | WEIGHT: 170 LBS | OXYGEN SATURATION: 99 % | SYSTOLIC BLOOD PRESSURE: 150 MMHG

## 2023-02-27 DIAGNOSIS — R09.89 OTHER SPECIFIED SYMPTOMS AND SIGNS INVOLVING THE CIRCULATORY AND RESPIRATORY SYSTEMS: ICD-10-CM

## 2023-02-27 DIAGNOSIS — J30.9 ALLERGIC RHINITIS, UNSPECIFIED: ICD-10-CM

## 2023-02-27 DIAGNOSIS — T78.3XXA ANGIONEUROTIC EDEMA, INITIAL ENCOUNTER: ICD-10-CM

## 2023-02-27 DIAGNOSIS — R49.0 DYSPHONIA: ICD-10-CM

## 2023-02-27 PROCEDURE — 99215 OFFICE O/P EST HI 40 MIN: CPT | Mod: 25

## 2023-02-27 PROCEDURE — 31575 DIAGNOSTIC LARYNGOSCOPY: CPT

## 2023-02-27 RX ORDER — OMEPRAZOLE, SODIUM BICARBONATE 40; 1100 MG/1; MG/1
40-1100 CAPSULE ORAL TWICE DAILY
Qty: 180 | Refills: 3 | Status: ACTIVE | COMMUNITY
Start: 2023-02-27 | End: 1900-01-01

## 2023-02-27 RX ORDER — FLUTICASONE PROPIONATE 50 UG/1
50 SPRAY, METERED NASAL
Qty: 1 | Refills: 3 | Status: ACTIVE | COMMUNITY
Start: 2023-02-27 | End: 1900-01-01

## 2023-02-27 NOTE — DATA REVIEWED
[de-identified] : see HPI [de-identified] : see HPI [de-identified] : Cytology report reviewed

## 2023-02-27 NOTE — CONSULT LETTER
[Dear  ___] : Dear  [unfilled], [Consult Letter:] : I had the pleasure of evaluating your patient, [unfilled]. [Please see my note below.] : Please see my note below. [Consult Closing:] : Thank you very much for allowing me to participate in the care of this patient.  If you have any questions, please do not hesitate to contact me. [Sincerely,] : Sincerely, [FreeTextEntry3] : \par Chidi Monk M.D., FACS, ECNU\par Director [facility]\par [facility]\par Certified in Thyroid/Parathyroid/Neck Ultrasound, ECNU/ AIUM\par \par , Department of Otolaryngology\par [facility] [employer] [facility]\par

## 2023-02-27 NOTE — HISTORY OF PRESENT ILLNESS
[de-identified] : Juliet is a 44 y/o female with type 2 diabetes who was in [place] mid March and returned home a week later and developed a slight cough, PND and hoarseness.  She had increased throat clearing and a pressure headache.  She had a low-grade fever which has since resolved. She took Advil cold and sinus with some relief of the headaches but she remains intermittently hoarse.  She has a history of hypothyroidism and remains on her usual dose of Synthroid at 88 mcg.  Her last set of thyroid function studies  demonstrated an elevated TSH at 4.60 which is above the normal range and associated with a free T4 at 1.5 which is in the normal range.  The T3 total was 80 in normal range.  Her serum calcium was normal.  She has known Hashimoto's thyroiditis and a 1.6 cm isoechoic nodule had been biopsied in the isthmus of the gland and 2007 and consistent with a hyperplastic nodule in Hashimoto's thyroiditis with a lymphoid infiltrate.  She had a thyroid ultrasound performed by her endocrinologist Dr. Lemos who noted a 0.8 x 0.7 cm left lobe nodule and a diffusely heterogeneous thyroid gland.  It is unclear whether this is a new nodule, pseudonodule or the isthmus nodule on the left. She denies recent weight loss, shortness of breath, but currently is hoarse. She also feels a sensation of tightness in her neck but denies pain.  She occasionally chokes on large pieces of meat but in general is able to swallow without difficulty and has not had any weight loss.  She has a long history of chronic GERD and frequently has pyrosis. \par  [FreeTextEntry1] : Juliet is here to f/u her thyroid nodules and management of her autoimmune thyroiditis.  She is stable on LT4 112 mcgs daily and euthyroid. She had a stress test in 2019 that was normal.  She has chronic GERD that she manages with Omeprazole PRN.  She denies any new neck mass to palpation or pain. There is no family history of thyroid cancer.  She denies any known radiation exposures in her youth. She is managed on Metformin for type 2 DM with a higher dose by her PCP who also continues to monitor her thyroid function.  She lost ten pounds over the past 4 months by eating healthier.  She has had a stable right right subcentimeter colloid cyst and a left lobe subcentimeter solid nodule not suspicious for malignancy.  An isthmus nodule was biopsied in 2007 and benign. She has not had a f/u ultrasound since 2019.  She denies fever, body aches, cough, cyanosis, chest burning, anosmia or recent known COVID exposures.  All family members at home are well. She is fully vaccinated and boosted. Last month she had a URI (fatigue, headache and sore throat)  and tested (-) for COVID x 2.  She initially improved with Advil cold and sinus for 6 days but now has a continued sensation in her throat x 1 week that something is caught and causing increased snoring and a dry mouth. She feels more hoarse during this time. She denies PND or hx of a sinus infection. She denies GERD sx's.  She never had an EGD as recommended last year. She denies true dysphagia. She had a Zpak earlier this month and a medrol simi.  While off these meds she noticed again a FB sensation in the left side of her throat. She had a negative Strep test.

## 2023-02-27 NOTE — REASON FOR VISIT
[FreeTextEntry2] :  a followup for a thyroid nodules, hypothyroidism, GERD and autoimmune thyroiditis, globus sensation. [FreeTextEntry1] : PCP is MD Shilpa  [facility]  [phone].  Dr. Marilynn Lemos is her Endocrinologist.  [address] [place] [address]

## 2023-02-27 NOTE — PROCEDURE
[Image(s) Captured] : image(s) captured and filed [Unable to Cooperate with Mirror] : patient unable to cooperate with mirror [Gag Reflex] : gag reflex preventing mirror examination [Complicated Symptoms] : complicated symptoms requiring more thorough examination than provided by mirror [Globus] : globus [Topical Lidocaine] : topical lidocaine [Oxymetazoline HCl] : oxymetazoline HCl [Flexible Endoscope] : examined with the flexible endoscope [Serial Number: ___] : Serial Number: [unfilled] [de-identified] : The nasal septum is minimally deviated to the left. There are no masses or polyps. Nasal secretions are thick and purulent.  Culture sent.  The choanae and posterior nasopharynx are normal without masses but with mucosal edema. The Eustachian tube orifices appear patent. The pharynx, including the posterior and lateral pharyngeal walls, the vallecula and base of tongue are normal without ulcerations, lesions or masses. The hypopharynx including the pyriform sinuses open well without pooling of secretions, mucosal lesions or masses. The supraglottic larynx including the epiglottis, petiole, glossoepiglottic folds and pharyngoepiglottic folds are normal without mucosal lesions, ulcerations or masses. The lingual and palatine tonsils are edematous and hyperemic with lymphoid hyperplasia. The glottis reveals normal false vocal folds. The true vocal folds are glistening white, tense and of equal length, without paralysis, having symmetric mobility on adduction and abduction. There are no mucosal lesions, nodules, cysts, erythroplasia or leukoplakia. The posterior cricoid area has healthy pink mucosa in the interarytenoid area and esophageal inlet. There is marked thickening/edema/tiger striping of the interarytenoid mucosa and lingual tonsillar hyperplasia consistent with laryngopharyngeal acid reflux disease. The trachea is clear without narrowing, deviation or lesions. Throat culture sent. \par  [de-identified] : thyroid nodules, recent URI and increased snoring with recurrent globus sensation

## 2023-02-28 ENCOUNTER — NON-APPOINTMENT (OUTPATIENT)
Age: 53
End: 2023-02-28

## 2023-03-02 ENCOUNTER — APPOINTMENT (OUTPATIENT)
Dept: GASTROENTEROLOGY | Facility: CLINIC | Age: 53
End: 2023-03-02
Payer: COMMERCIAL

## 2023-03-02 VITALS
DIASTOLIC BLOOD PRESSURE: 85 MMHG | SYSTOLIC BLOOD PRESSURE: 127 MMHG | HEIGHT: 65 IN | TEMPERATURE: 98 F | OXYGEN SATURATION: 97 % | WEIGHT: 177 LBS | BODY MASS INDEX: 29.49 KG/M2 | HEART RATE: 97 BPM

## 2023-03-02 DIAGNOSIS — Z20.822 ENCOUNTER FOR PREPROCEDURAL LABORATORY EXAMINATION: ICD-10-CM

## 2023-03-02 DIAGNOSIS — K21.9 GASTRO-ESOPHAGEAL REFLUX DISEASE W/OUT ESOPHAGITIS: ICD-10-CM

## 2023-03-02 DIAGNOSIS — R11.10 VOMITING, UNSPECIFIED: ICD-10-CM

## 2023-03-02 DIAGNOSIS — Z01.812 ENCOUNTER FOR PREPROCEDURAL LABORATORY EXAMINATION: ICD-10-CM

## 2023-03-02 DIAGNOSIS — R19.7 DIARRHEA, UNSPECIFIED: ICD-10-CM

## 2023-03-02 DIAGNOSIS — J02.9 ACUTE PHARYNGITIS, UNSPECIFIED: ICD-10-CM

## 2023-03-02 DIAGNOSIS — R22.1 LOCALIZED SWELLING, MASS AND LUMP, NECK: ICD-10-CM

## 2023-03-02 DIAGNOSIS — R13.10 DYSPHAGIA, UNSPECIFIED: ICD-10-CM

## 2023-03-02 DIAGNOSIS — J01.00 ACUTE MAXILLARY SINUSITIS, UNSPECIFIED: ICD-10-CM

## 2023-03-02 DIAGNOSIS — E11.9 TYPE 2 DIABETES MELLITUS W/OUT COMPLICATIONS: ICD-10-CM

## 2023-03-02 DIAGNOSIS — Z12.11 ENCOUNTER FOR SCREENING FOR MALIGNANT NEOPLASM OF COLON: ICD-10-CM

## 2023-03-02 DIAGNOSIS — K14.8 OTHER DISEASES OF TONGUE: ICD-10-CM

## 2023-03-02 LAB
25(OH)D3 SERPL-MCNC: 30.4 NG/ML
ACE BLD-CCNC: 56 U/L
ALBUMIN SERPL ELPH-MCNC: 4.4 G/DL
ALP BLD-CCNC: 86 U/L
ALT SERPL-CCNC: 23 U/L
ANION GAP SERPL CALC-SCNC: 19 MMOL/L
AST SERPL-CCNC: 20 U/L
BACTERIA NOSE AEROBE CULT: ABNORMAL
BASOPHILS # BLD AUTO: 0.06 K/UL
BASOPHILS NFR BLD AUTO: 0.9 %
BILIRUB SERPL-MCNC: 0.6 MG/DL
BUN SERPL-MCNC: 12 MG/DL
CALCIUM SERPL-MCNC: 9.7 MG/DL
CHLORIDE SERPL-SCNC: 97 MMOL/L
CO2 SERPL-SCNC: 23 MMOL/L
CREAT SERPL-MCNC: 0.49 MG/DL
CRP SERPL-MCNC: 13 MG/L
EGFR: 112 ML/MIN/1.73M2
EOSINOPHIL # BLD AUTO: 0.14 K/UL
EOSINOPHIL NFR BLD AUTO: 2.1 %
GLUCOSE SERPL-MCNC: 270 MG/DL
HCT VFR BLD CALC: 39 %
HGB BLD-MCNC: 12.5 G/DL
IMM GRANULOCYTES NFR BLD AUTO: 0.3 %
LYMPHOCYTES # BLD AUTO: 2.03 K/UL
LYMPHOCYTES NFR BLD AUTO: 30.1 %
MAN DIFF?: NORMAL
MCHC RBC-ENTMCNC: 26.8 PG
MCHC RBC-ENTMCNC: 32.1 GM/DL
MCV RBC AUTO: 83.7 FL
MONOCYTES # BLD AUTO: 0.51 K/UL
MONOCYTES NFR BLD AUTO: 7.6 %
NEUTROPHILS # BLD AUTO: 3.98 K/UL
NEUTROPHILS NFR BLD AUTO: 59 %
PLATELET # BLD AUTO: 244 K/UL
POTASSIUM SERPL-SCNC: 4.5 MMOL/L
PROT SERPL-MCNC: 7.4 G/DL
RBC # BLD: 4.66 M/UL
RBC # FLD: 13.8 %
SODIUM SERPL-SCNC: 139 MMOL/L
T4 FREE SERPL-MCNC: 1.6 NG/DL
THYROGLOB AB SERPL-ACNC: <20 IU/ML
THYROPEROXIDASE AB SERPL IA-ACNC: 119 IU/ML
TSH SERPL-ACNC: 1.32 UIU/ML
WBC # FLD AUTO: 6.74 K/UL

## 2023-03-02 PROCEDURE — 99204 OFFICE O/P NEW MOD 45 MIN: CPT

## 2023-03-02 RX ORDER — SULFAMETHOXAZOLE AND TRIMETHOPRIM 800; 160 MG/1; MG/1
800-160 TABLET ORAL TWICE DAILY
Qty: 20 | Refills: 0 | Status: DISCONTINUED | COMMUNITY
Start: 2017-11-09 | End: 2023-03-02

## 2023-03-02 RX ORDER — METHYLPREDNISOLONE 4 MG/1
4 TABLET ORAL
Qty: 1 | Refills: 0 | Status: COMPLETED | COMMUNITY
Start: 2023-02-02 | End: 2023-03-02

## 2023-03-02 RX ORDER — FLUTICASONE PROPIONATE 50 UG/1
50 SPRAY, METERED NASAL
Qty: 1 | Refills: 3 | Status: DISCONTINUED | COMMUNITY
Start: 2017-11-09 | End: 2023-03-02

## 2023-03-02 RX ORDER — FLUTICASONE PROPIONATE 50 UG/1
50 SPRAY, METERED NASAL
Qty: 1 | Refills: 3 | Status: DISCONTINUED | COMMUNITY
Start: 2019-03-20 | End: 2023-03-02

## 2023-03-02 RX ORDER — MONTELUKAST 10 MG/1
10 TABLET, FILM COATED ORAL
Qty: 60 | Refills: 2 | Status: DISCONTINUED | COMMUNITY
Start: 2023-02-27 | End: 2023-03-02

## 2023-03-02 RX ORDER — SULFAMETHOXAZOLE AND TRIMETHOPRIM 800; 160 MG/1; MG/1
800-160 TABLET ORAL TWICE DAILY
Qty: 14 | Refills: 0 | Status: DISCONTINUED | COMMUNITY
Start: 2023-02-27 | End: 2023-03-02

## 2023-03-03 PROBLEM — Z12.11 COLON CANCER SCREENING: Status: ACTIVE | Noted: 2023-03-03

## 2023-03-03 NOTE — ASSESSMENT
[FreeTextEntry1] : Recurrent sore throat and sinus infection rule out association with acid reflux. Dietary and lifestyle modification discussed with the patient.  The patient will be scheduled for an EGD and biopsy.\par Colon cancer screening.  Importance of screening colonoscopy was discussed with the patient.  She will schedule this pending the results of her EGD.  She understands and all questions were answered.\par \par

## 2023-03-03 NOTE — PHYSICAL EXAM
[Alert] : alert [Normal Voice/Communication] : normal voice/communication [Healthy Appearing] : healthy appearing [No Acute Distress] : no acute distress [Sclera] : the sclera and conjunctiva were normal [Hearing Threshold Finger Rub Not [place]] : hearing was normal [Normal Lips/Gums] : the lips and gums were normal [Oropharynx] : the oropharynx was normal [Normal Appearance] : the appearance of the neck was normal [No Neck Mass] : no neck mass was observed [No Respiratory Distress] : no respiratory distress [No Acc Muscle Use] : no accessory muscle use [Respiration, Rhythm And Depth] : normal respiratory rhythm and effort [Auscultation Breath Sounds / Voice Sounds] : lungs were clear to auscultation bilaterally [Heart Rate And Rhythm] : heart rate was normal and rhythm regular [Normal S1, S2] : normal S1 and S2 [Murmurs] : no murmurs [None] : no edema [Bowel Sounds] : normal bowel sounds [Abdomen Tenderness] : non-tender [No Masses] : no abdominal mass palpated [Abdomen Soft] : soft [] : no hepatosplenomegaly [Cervical Lymph Nodes Enlarged Posterior Bilaterally] : no posterior cervical lymphadenopathy [Supraclavicular Lymph Nodes Enlarged Bilaterally] : no supraclavicular lymphadenopathy [No CVA Tenderness] : no CVA  tenderness [Cranial Nerves Intact] : cranial nerves 2-12 were intact [Motor Exam] : the motor exam was normal [Normal] : oriented to person, place, and time [Oriented To Time, Place, And Person] : oriented to person, place, and time [Normal Mood] : the mood was normal

## 2023-03-03 NOTE — HISTORY OF PRESENT ILLNESS
[FreeTextEntry1] : 52-year-old woman being followed by ENT for recurrent sinusitis and throat infection.  She also has a history of GERD for which she is taking omeprazole.  She is referred for an EGD to determine possible association between her recurrent throat and sinus problems as a result of acid reflux.  She is on thyroid supplement after treatment for thyroiditis.  She is otherwise in good health.  She denies rectal bleeding, melena or hematemesis.  She is also due for a screening colonoscopy.

## 2023-03-06 RX ORDER — OMEPRAZOLE, SODIUM BICARBONATE 40; 1100 MG/1; MG/1
40-1100 CAPSULE ORAL TWICE DAILY
Qty: 180 | Refills: 3 | Status: ACTIVE | COMMUNITY
Start: 2023-03-06 | End: 1900-01-01

## 2023-03-13 DIAGNOSIS — K21.9 ACUTE LARYNGITIS: ICD-10-CM

## 2023-03-13 DIAGNOSIS — J04.0 ACUTE LARYNGITIS: ICD-10-CM

## 2023-03-13 RX ORDER — OMEPRAZOLE 40 MG/1
40 CAPSULE, DELAYED RELEASE ORAL
Qty: 30 | Refills: 1 | Status: ACTIVE | COMMUNITY
Start: 2023-03-13 | End: 1900-01-01

## 2023-03-14 ENCOUNTER — APPOINTMENT (OUTPATIENT)
Dept: OTOLARYNGOLOGY | Facility: CLINIC | Age: 53
End: 2023-03-14

## 2023-05-02 ENCOUNTER — APPOINTMENT (OUTPATIENT)
Dept: GASTROENTEROLOGY | Facility: CLINIC | Age: 53
End: 2023-05-02

## 2023-05-03 ENCOUNTER — LABORATORY RESULT (OUTPATIENT)
Age: 53
End: 2023-05-03

## 2023-05-03 ENCOUNTER — APPOINTMENT (OUTPATIENT)
Dept: RHEUMATOLOGY | Facility: CLINIC | Age: 53
End: 2023-05-03
Payer: COMMERCIAL

## 2023-05-03 VITALS
BODY MASS INDEX: 30.16 KG/M2 | HEIGHT: 65 IN | WEIGHT: 181 LBS | OXYGEN SATURATION: 97 % | DIASTOLIC BLOOD PRESSURE: 61 MMHG | SYSTOLIC BLOOD PRESSURE: 113 MMHG | TEMPERATURE: 98 F | HEART RATE: 81 BPM

## 2023-05-03 DIAGNOSIS — J32.9 CHRONIC SINUSITIS, UNSPECIFIED: ICD-10-CM

## 2023-05-03 DIAGNOSIS — J03.90 ACUTE TONSILLITIS, UNSPECIFIED: ICD-10-CM

## 2023-05-03 DIAGNOSIS — J31.1 CHRONIC NASOPHARYNGITIS: ICD-10-CM

## 2023-05-03 PROCEDURE — 99204 OFFICE O/P NEW MOD 45 MIN: CPT

## 2023-05-03 RX ORDER — CLARITHROMYCIN 500 MG/1
500 TABLET, FILM COATED ORAL TWICE DAILY
Qty: 28 | Refills: 0 | Status: DISCONTINUED | COMMUNITY
Start: 2023-02-02 | End: 2023-05-03

## 2023-05-03 RX ORDER — SULFAMETHOXAZOLE AND TRIMETHOPRIM 800; 160 MG/1; MG/1
800-160 TABLET ORAL TWICE DAILY
Qty: 20 | Refills: 0 | Status: DISCONTINUED | COMMUNITY
Start: 2023-02-27 | End: 2023-05-03

## 2023-05-03 NOTE — PHYSICAL EXAM
[General Appearance - Alert] : alert [General Appearance - In No Acute Distress] : in no acute distress [General Appearance - Well Nourished] : well nourished [General Appearance - Well Developed] : well developed [Neck Appearance] : the appearance of the neck was normal [Neck Cervical Mass (___cm)] : no neck mass was observed [Respiration, Rhythm And Depth] : normal respiratory rhythm and effort [Exaggerated Use Of Accessory Muscles For Inspiration] : no accessory muscle use [Auscultation Breath Sounds / Voice Sounds] : lungs were clear to auscultation bilaterally [Heart Rate And Rhythm] : heart rate was normal and rhythm regular [Heart Sounds] : normal S1 and S2 [Heart Sounds Gallop] : no gallops [Cervical Lymph Nodes Enlarged Anterior Bilaterally] : anterior cervical [Cervical Lymph Nodes Enlarged Posterior Bilaterally] : posterior cervical [Supraclavicular Lymph Nodes Enlarged Bilaterally] : supraclavicular [No CVA Tenderness] : no ~M costovertebral angle tenderness [No Spinal Tenderness] : no spinal tenderness [Nail Clubbing] : no clubbing  or cyanosis of the fingernails [Abnormal Walk] : normal gait [Musculoskeletal - Swelling] : no joint swelling seen [Motor Tone] : muscle strength and tone were normal [Skin Color & Pigmentation] : normal skin color and pigmentation [Skin Turgor] : normal skin turgor [] : no rash [Skin Lesions] : no skin lesions [Motor Exam] : the motor exam was normal [Oriented To Time, Place, And Person] : oriented to person, place, and time [Impaired Insight] : insight and judgment were intact [Affect] : the affect was normal [Mood] : the mood was normal

## 2023-05-05 NOTE — REVIEW OF SYSTEMS
[As Noted in HPI] : as noted in HPI [Negative] : Heme/Lymph [Fever] : no fever [Chills] : no chills [Dry Eyes] : no dryness of the eyes [FreeTextEntry4] : occasional dry mouth

## 2023-05-05 NOTE — ASSESSMENT
[FreeTextEntry1] : 51 YO female with PMH recurrent sinusitis and Hashimotos thyroditis who presents to rheumatology for possible AI component of sinusitis:\par \par Recurrent Sinusitis:\par -Will get CT of sinuses and neck \par -Will get lab work to assess vasculitis component \par if negative imaging and new scope will request biopsy of sinus tissue\par \par patient to call in 1 week to discuss results and next steps\par \par \par \par f/u 8-10 weeks

## 2023-05-05 NOTE — HISTORY OF PRESENT ILLNESS
[FreeTextEntry1] : Has had sinus infection x 3 since January. Completed three rounds of antibiotics, still with ongoing mild congestion - no imaging is available\par ENT scope showed inflammation and purulent discharge\par . Does not have a history of sinus infections. Following closely with ENT who recommended Rheum work up and GI work up. \par No other complains today\par \par Endorses dry mouth and snoring. Endorses sensitive skin and occasional rashes associated with foods and perfumes. Endorses occasional bl heel pain and ankle pain. Occurs everyday. Improves with massage. Pain improves with activity and is worse with rest. \par Denies fever, chills, sob, pleuritic pain, chest pain, palpitations, abdominal pain, n/v/d. Denies MI/CVA/TIA or miscarriage. \par \par FH: denies family history of autoimmune disease. +FH of DM2 [Weight Loss] : no weight loss [Fever] : no fever [Chills] : no chills [Malar Facial Rash] : no malar facial rash [Skin Lesions] : no lesions

## 2023-05-10 LAB
CRP SERPL-MCNC: 13 MG/L
DEPRECATED KAPPA LC FREE/LAMBDA SER: 1.24 RATIO
ENA RNP AB SER IA-ACNC: 0.2 AL
ENA SM AB SER IA-ACNC: <0.2 AL
ENA SS-A AB SER IA-ACNC: <0.2 AL
ENA SS-B AB SER IA-ACNC: <0.2 AL
ERYTHROCYTE [SEDIMENTATION RATE] IN BLOOD BY WESTERGREN METHOD: 36 MM/HR
IGA SER QL IEP: 296 MG/DL
IGG SER QL IEP: 1431 MG/DL
IGG SUBSET TOTAL IGG: 1523 MG/DL
IGG1 SER-MCNC: 856 MG/DL
IGG2 SER-MCNC: 434 MG/DL
IGG3 SER-MCNC: 28 MG/DL
IGG4 SER-MCNC: 61 MG/DL
IGM SER QL IEP: 125 MG/DL
KAPPA LC CSF-MCNC: 1.68 MG/DL
KAPPA LC SERPL-MCNC: 2.09 MG/DL

## 2023-05-12 ENCOUNTER — APPOINTMENT (OUTPATIENT)
Dept: CT IMAGING | Facility: CLINIC | Age: 53
End: 2023-05-12

## 2023-06-20 DIAGNOSIS — E03.9 HYPOTHYROIDISM, UNSPECIFIED: ICD-10-CM

## 2023-06-20 DIAGNOSIS — D44.0 NEOPLASM OF UNCERTAIN BEHAVIOR OF THYROID GLAND: ICD-10-CM

## 2023-06-20 DIAGNOSIS — E06.3 AUTOIMMUNE THYROIDITIS: ICD-10-CM

## 2023-06-20 RX ORDER — LEVOTHYROXINE SODIUM 0.11 MG/1
112 TABLET ORAL DAILY
Qty: 90 | Refills: 3 | Status: ACTIVE | COMMUNITY
Start: 2023-06-20 | End: 1900-01-01

## 2023-11-26 ENCOUNTER — NON-APPOINTMENT (OUTPATIENT)
Age: 53
End: 2023-11-26

## 2024-04-25 ENCOUNTER — APPOINTMENT (OUTPATIENT)
Dept: OTOLARYNGOLOGY | Facility: CLINIC | Age: 54
End: 2024-04-25

## 2024-08-08 ENCOUNTER — RX RENEWAL (OUTPATIENT)
Age: 54
End: 2024-08-08

## 2024-09-10 NOTE — HISTORY OF PRESENT ILLNESS
[de-identified] : Juliet is a 42 y/o female with type 2 diabetes who was in [place] mid March and returned home a week later and developed a slight cough, PND and hoarseness.  She had increased throat clearing and a pressure headache.  She had a low-grade fever which has since resolved. She took Advil cold and sinus with some relief of the headaches but she remains intermittently hoarse.  She has a history of hypothyroidism and remains on her usual dose of Synthroid at 88 mcg.  Her last set of thyroid function studies  demonstrated an elevated TSH at 4.60 which is above the normal range and associated with a free T4 at 1.5 which is in the normal range.  The T3 total was 80 in normal range.  Her serum calcium was normal.  She has known Hashimoto's thyroiditis and a 1.6 cm isoechoic nodule had been biopsied in the isthmus of the gland and 2007 and consistent with a hyperplastic nodule in Hashimoto's thyroiditis with a lymphoid infiltrate.  She had a thyroid ultrasound performed by her endocrinologist Dr. Lemos who noted a 0.8 x 0.7 cm left lobe nodule and a diffusely heterogeneous thyroid gland.  It is unclear whether this is a new nodule, pseudonodule or the isthmus nodule on the left. She denies recent weight loss, shortness of breath, but currently is hoarse. She also feels a sensation of tightness in her neck but denies pain.  She occasionally chokes on large pieces of meat but in general is able to swallow without difficulty and has not had any weight loss.  She has a long history of chronic GERD and frequently has pyrosis. \par  [FreeTextEntry1] : Juliet is here to f/u her thyroid nodules and management of her autoimmune thyroiditis. She complains of a tightening in her neck and throat for the past week causing nocturnal arousals.  She has a globus sensation that forces her to swallow saliva repetitively but denies true dysphagia. She had a preliminary cardiac evaluation for chest tightness as well that was normal and a stress test is pending. She has intermittent shortness of breath at rest and with activity and also has intermittent hoarseness.  She has chronic GERD that she manages with Omeprazole PRN.  She denies any new neck mass to palpation or pain. She feels that these symptoms are worse around the time of menstruation but has regular periods.  There is no family history of thyroid cancer. She denies any known radiation exposures in her youth.  .  She remains on Synthroid 112 mcg and feels well. Her last TSH in November was 0.82 but with a slightly elevated free T4.  She is managed on Metformin for type 2 DM by her PCP who also continues to monitor her thyroid function.  Her weight has been stable.  She denies fever chills, cough or night sweats.  She has persistent thick mucus and has been clearing her throat for the past 6 days.  She had a stable right right subcentimeter colloid cyst and a left lobe subcentimeter solid nodule not suspicious for malignancy last visit and she does not need another US for another year.  Strong peripheral pulses

## 2025-03-03 ENCOUNTER — APPOINTMENT (OUTPATIENT)
Dept: OTOLARYNGOLOGY | Facility: CLINIC | Age: 55
End: 2025-03-03

## 2025-05-06 ENCOUNTER — APPOINTMENT (OUTPATIENT)
Dept: OTOLARYNGOLOGY | Facility: CLINIC | Age: 55
End: 2025-05-06
Payer: COMMERCIAL

## 2025-05-06 VITALS
HEART RATE: 104 BPM | SYSTOLIC BLOOD PRESSURE: 139 MMHG | HEIGHT: 65 IN | WEIGHT: 181 LBS | DIASTOLIC BLOOD PRESSURE: 86 MMHG | OXYGEN SATURATION: 97 % | TEMPERATURE: 98 F | BODY MASS INDEX: 30.16 KG/M2

## 2025-05-06 DIAGNOSIS — R06.83 SNORING: ICD-10-CM

## 2025-05-06 DIAGNOSIS — K21.9 ACUTE LARYNGITIS: ICD-10-CM

## 2025-05-06 DIAGNOSIS — D44.0 NEOPLASM OF UNCERTAIN BEHAVIOR OF THYROID GLAND: ICD-10-CM

## 2025-05-06 DIAGNOSIS — J04.0 ACUTE LARYNGITIS: ICD-10-CM

## 2025-05-06 DIAGNOSIS — E06.3 AUTOIMMUNE THYROIDITIS: ICD-10-CM

## 2025-05-06 DIAGNOSIS — R49.9 UNSPECIFIED VOICE AND RESONANCE DISORDER: ICD-10-CM

## 2025-05-06 DIAGNOSIS — J30.9 ALLERGIC RHINITIS, UNSPECIFIED: ICD-10-CM

## 2025-05-06 PROCEDURE — 99215 OFFICE O/P EST HI 40 MIN: CPT | Mod: 25

## 2025-05-06 PROCEDURE — 31575 DIAGNOSTIC LARYNGOSCOPY: CPT

## 2025-05-06 PROCEDURE — 76536 US EXAM OF HEAD AND NECK: CPT

## 2025-05-13 LAB
25(OH)D3 SERPL-MCNC: 18.8 NG/ML
T4 FREE SERPL-MCNC: 1.5 NG/DL
TSH SERPL-ACNC: 0.89 UIU/ML

## 2025-06-02 ENCOUNTER — APPOINTMENT (OUTPATIENT)
Dept: SLEEP CENTER | Facility: HOME HEALTH | Age: 55
End: 2025-06-02

## 2025-08-19 ENCOUNTER — APPOINTMENT (OUTPATIENT)
Dept: GASTROENTEROLOGY | Facility: CLINIC | Age: 55
End: 2025-08-19